# Patient Record
Sex: MALE | Race: WHITE | NOT HISPANIC OR LATINO | URBAN - METROPOLITAN AREA
[De-identification: names, ages, dates, MRNs, and addresses within clinical notes are randomized per-mention and may not be internally consistent; named-entity substitution may affect disease eponyms.]

---

## 2019-07-27 ENCOUNTER — EMERGENCY (EMERGENCY)
Facility: HOSPITAL | Age: 39
LOS: 1 days | Discharge: HOME | End: 2019-07-27
Attending: EMERGENCY MEDICINE | Admitting: EMERGENCY MEDICINE
Payer: COMMERCIAL

## 2019-07-27 VITALS
SYSTOLIC BLOOD PRESSURE: 143 MMHG | RESPIRATION RATE: 16 BRPM | DIASTOLIC BLOOD PRESSURE: 86 MMHG | HEART RATE: 90 BPM | OXYGEN SATURATION: 97 %

## 2019-07-27 VITALS
SYSTOLIC BLOOD PRESSURE: 133 MMHG | OXYGEN SATURATION: 95 % | DIASTOLIC BLOOD PRESSURE: 80 MMHG | HEART RATE: 117 BPM | TEMPERATURE: 99 F | RESPIRATION RATE: 16 BRPM

## 2019-07-27 DIAGNOSIS — R56.9 UNSPECIFIED CONVULSIONS: ICD-10-CM

## 2019-07-27 PROCEDURE — 99283 EMERGENCY DEPT VISIT LOW MDM: CPT

## 2019-07-27 RX ORDER — LAMOTRIGINE 25 MG/1
1 TABLET, ORALLY DISINTEGRATING ORAL
Qty: 0 | Refills: 0 | DISCHARGE

## 2019-07-27 NOTE — ED PROVIDER NOTE - ENMT, MLM
Airway patent. Positive for tongue bites. Airway patent. Positive for tongue bites- superficial R side

## 2019-07-27 NOTE — ED ADULT NURSE NOTE - NSIMPLEMENTINTERV_GEN_ALL_ED
Implemented All Fall Risk Interventions:  [place] to call system. Call bell, personal items and telephone within reach. Instruct patient to call for assistance. Room bathroom lighting operational. Non-slip footwear when patient is off stretcher. Physically safe environment: no spills, clutter or unnecessary equipment. Stretcher in lowest position, wheels locked, appropriate side rails in place. Provide visual cue, wrist band, yellow gown, etc. Monitor gait and stability. Monitor for mental status changes and reorient to person, place, and time. Review medications for side effects contributing to fall risk. Reinforce activity limits and safety measures with patient and family.

## 2019-07-27 NOTE — ED PROVIDER NOTE - CARDIAC, MLM
Detail Level: Zone
Samples Given: Vanicream soap- wash body with daily\\nLipikar jose Rodríguez posay - apply daily to damp skin after bathing
Plan: Discussed melamin C and patient will let us know if she wants to purchase the product
Normal rate, regular rhythm.  Heart sounds S1, S2.  No murmurs, rubs or gallops.

## 2019-07-27 NOTE — ED PROVIDER NOTE - CLINICAL SUMMARY MEDICAL DECISION MAKING FREE TEXT BOX
39 y/o M presenting with typical Sz in the setting of likely missed AM Lamictal dose. Pt reports that this is typical for him and that he feels completely at baseline. He lives nearby and states he would like to go home and take his Lamictal and hydrate orally. Will give PO Pedialyte and D/C home.

## 2019-07-27 NOTE — ED PROVIDER NOTE - NSFOLLOWUPINSTRUCTIONS_ED_ALL_ED_FT
Rest, stay hydrated.    Follow up with your Neurologist within a week or two.    Return to the ER for any worrisome symptoms or seizures /auras.

## 2019-07-27 NOTE — ED PROVIDER NOTE - OBJECTIVE STATEMENT
37 y/o M with PMHx of Sz is BIB EMS for at least one episode of seizing. Pt reports he was out for a run. The last thing he remembers is waking up in an ambulance. Pt promotes he had a normal aura prior to Sz starting, and states this usually occurs with past seizures. He denies any other acute medical complaints. Pt states his tetanus shot is UTD.     Pt states he takes Lamictal. 39 y/o M with PMHx of Sz is BIB EMS for at least one episode of seizing. Pt reports he was out for a run in the heat. The last thing he remembers is waking up in an ambulance. Pt promotes he had a normal aura prior to Sz starting, and states this usually occurs with past seizures. He promotes he did not drink enough water. He denies any other acute medical complaints. Pt states his tetanus shot is UTD.     Pt states he takes Lamictal.

## 2019-12-30 NOTE — ED ADULT NURSE NOTE - NSSUHOSCREENINGYN_ED_ALL_ED
Yes - the patient is able to be screened My signature below certifies that the above stated patient is homebound and upon completion of the Face-To-Face encounter, has the need for intermittent skilled nursing, physical therapy and/or speech or occupational therapy services in their home for their current diagnosis as outlined in their initial plan of care. These services will continue to be monitored by myself or another physician.

## 2021-06-17 NOTE — ED ADULT NURSE NOTE - CARDIO ASSESSMENT
Take your nausea and pain medicines as needed.  Increase your water intake and start your tamsulosin to help your kidney stones pass.  Please contact Dr. Morales, urologist for follow-up on your kidney stones.  You will need to have your kidney function rechecked in the next 1 to 2 weeks this may be done by your primary care provider.  Please return to the ER for persistent vomiting, increased pain, fever, worsening symptoms.  
WDL

## 2024-01-29 NOTE — ED ADULT NURSE NOTE - CAS EDP DISCH TYPE
[No Rash or Lesion] : No rash or lesion [Oriented to Person] : oriented to person [Alert] : alert [Oriented to Place] : oriented to place [Oriented to Time] : oriented to time [Calm] : calm [de-identified] : well, healthy-appearing adult [de-identified] : ANNITA BOWMAN EOMI  [de-identified] : soft, NT, ND, no evidence of hernia or diastasis, incisions well-healed without erythema or drainage Home